# Patient Record
Sex: FEMALE | Race: WHITE | Employment: OTHER | URBAN - METROPOLITAN AREA
[De-identification: names, ages, dates, MRNs, and addresses within clinical notes are randomized per-mention and may not be internally consistent; named-entity substitution may affect disease eponyms.]

---

## 2020-08-17 ENCOUNTER — HOSPITAL ENCOUNTER (EMERGENCY)
Age: 60
Discharge: LEFT BEFORE DISCHARGE | End: 2020-08-17
Attending: EMERGENCY MEDICINE
Payer: COMMERCIAL

## 2020-08-17 ENCOUNTER — APPOINTMENT (OUTPATIENT)
Dept: GENERAL RADIOLOGY | Age: 60
End: 2020-08-17
Payer: COMMERCIAL

## 2020-08-17 ENCOUNTER — APPOINTMENT (OUTPATIENT)
Dept: CT IMAGING | Age: 60
End: 2020-08-17
Payer: COMMERCIAL

## 2020-08-17 VITALS
BODY MASS INDEX: 45.89 KG/M2 | TEMPERATURE: 97.4 F | HEART RATE: 83 BPM | HEIGHT: 63 IN | OXYGEN SATURATION: 98 % | RESPIRATION RATE: 18 BRPM | DIASTOLIC BLOOD PRESSURE: 61 MMHG | WEIGHT: 259 LBS | SYSTOLIC BLOOD PRESSURE: 126 MMHG

## 2020-08-17 LAB
ALBUMIN SERPL-MCNC: 4 G/DL (ref 3.5–4.6)
ALP BLD-CCNC: 83 U/L (ref 40–130)
ALT SERPL-CCNC: 26 U/L (ref 0–33)
ANION GAP SERPL CALCULATED.3IONS-SCNC: 14 MEQ/L (ref 9–15)
APTT: 20.7 SEC (ref 24.4–36.8)
AST SERPL-CCNC: 18 U/L (ref 0–35)
BASOPHILS ABSOLUTE: 0 K/UL (ref 0–0.2)
BASOPHILS RELATIVE PERCENT: 0.4 %
BILIRUB SERPL-MCNC: 0.3 MG/DL (ref 0.2–0.7)
BUN BLDV-MCNC: 35 MG/DL (ref 8–23)
CALCIUM SERPL-MCNC: 9.6 MG/DL (ref 8.5–9.9)
CHLORIDE BLD-SCNC: 104 MEQ/L (ref 95–107)
CO2: 22 MEQ/L (ref 20–31)
CREAT SERPL-MCNC: 1.31 MG/DL (ref 0.5–0.9)
D DIMER: 0.55 MG/L FEU (ref 0–0.5)
EKG ATRIAL RATE: 91 BPM
EKG P AXIS: 83 DEGREES
EKG P-R INTERVAL: 144 MS
EKG Q-T INTERVAL: 370 MS
EKG QRS DURATION: 90 MS
EKG QTC CALCULATION (BAZETT): 455 MS
EKG R AXIS: 25 DEGREES
EKG T AXIS: 89 DEGREES
EKG VENTRICULAR RATE: 91 BPM
EOSINOPHILS ABSOLUTE: 0 K/UL (ref 0–0.7)
EOSINOPHILS RELATIVE PERCENT: 0.4 %
GFR AFRICAN AMERICAN: 49.9
GFR NON-AFRICAN AMERICAN: 41.2
GLOBULIN: 2.7 G/DL (ref 2.3–3.5)
GLUCOSE BLD-MCNC: 181 MG/DL (ref 70–99)
HCT VFR BLD CALC: 42.1 % (ref 37–47)
HEMOGLOBIN: 13.7 G/DL (ref 12–16)
INR BLD: 0.9
LIPASE: 28 U/L (ref 12–95)
LYMPHOCYTES ABSOLUTE: 1.1 K/UL (ref 1–4.8)
LYMPHOCYTES RELATIVE PERCENT: 9.3 %
MCH RBC QN AUTO: 29.4 PG (ref 27–31.3)
MCHC RBC AUTO-ENTMCNC: 32.5 % (ref 33–37)
MCV RBC AUTO: 90.3 FL (ref 82–100)
MONOCYTES ABSOLUTE: 0.6 K/UL (ref 0.2–0.8)
MONOCYTES RELATIVE PERCENT: 5.3 %
NEUTROPHILS ABSOLUTE: 9.7 K/UL (ref 1.4–6.5)
NEUTROPHILS RELATIVE PERCENT: 84.6 %
PDW BLD-RTO: 14.8 % (ref 11.5–14.5)
PLATELET # BLD: 230 K/UL (ref 130–400)
POTASSIUM REFLEX MAGNESIUM: 4.1 MEQ/L (ref 3.4–4.9)
PROTHROMBIN TIME: 12.5 SEC (ref 12.3–14.9)
RBC # BLD: 4.66 M/UL (ref 4.2–5.4)
SODIUM BLD-SCNC: 140 MEQ/L (ref 135–144)
TOTAL PROTEIN: 6.7 G/DL (ref 6.3–8)
TROPONIN: 0.01 NG/ML (ref 0–0.01)
WBC # BLD: 11.5 K/UL (ref 4.8–10.8)

## 2020-08-17 PROCEDURE — 6360000002 HC RX W HCPCS: Performed by: EMERGENCY MEDICINE

## 2020-08-17 PROCEDURE — 71045 X-RAY EXAM CHEST 1 VIEW: CPT

## 2020-08-17 PROCEDURE — 85730 THROMBOPLASTIN TIME PARTIAL: CPT

## 2020-08-17 PROCEDURE — 83690 ASSAY OF LIPASE: CPT

## 2020-08-17 PROCEDURE — 99284 EMERGENCY DEPT VISIT MOD MDM: CPT

## 2020-08-17 PROCEDURE — 85379 FIBRIN DEGRADATION QUANT: CPT

## 2020-08-17 PROCEDURE — 85610 PROTHROMBIN TIME: CPT

## 2020-08-17 PROCEDURE — 96374 THER/PROPH/DIAG INJ IV PUSH: CPT

## 2020-08-17 PROCEDURE — 85025 COMPLETE CBC W/AUTO DIFF WBC: CPT

## 2020-08-17 PROCEDURE — 93005 ELECTROCARDIOGRAM TRACING: CPT | Performed by: EMERGENCY MEDICINE

## 2020-08-17 PROCEDURE — 84484 ASSAY OF TROPONIN QUANT: CPT

## 2020-08-17 PROCEDURE — 80053 COMPREHEN METABOLIC PANEL: CPT

## 2020-08-17 PROCEDURE — 6370000000 HC RX 637 (ALT 250 FOR IP): Performed by: EMERGENCY MEDICINE

## 2020-08-17 PROCEDURE — 2580000003 HC RX 258: Performed by: EMERGENCY MEDICINE

## 2020-08-17 PROCEDURE — 36415 COLL VENOUS BLD VENIPUNCTURE: CPT

## 2020-08-17 RX ORDER — 0.9 % SODIUM CHLORIDE 0.9 %
1000 INTRAVENOUS SOLUTION INTRAVENOUS ONCE
Status: COMPLETED | OUTPATIENT
Start: 2020-08-17 | End: 2020-08-17

## 2020-08-17 RX ORDER — ACETAMINOPHEN 325 MG/1
650 TABLET ORAL ONCE
Status: COMPLETED | OUTPATIENT
Start: 2020-08-17 | End: 2020-08-17

## 2020-08-17 RX ORDER — ONDANSETRON 2 MG/ML
INJECTION INTRAMUSCULAR; INTRAVENOUS
Status: DISPENSED
Start: 2020-08-17 | End: 2020-08-18

## 2020-08-17 RX ORDER — SODIUM CHLORIDE 9 MG/ML
INJECTION, SOLUTION INTRAVENOUS
Status: DISPENSED
Start: 2020-08-17 | End: 2020-08-18

## 2020-08-17 RX ORDER — ONDANSETRON 2 MG/ML
4 INJECTION INTRAMUSCULAR; INTRAVENOUS ONCE
Status: COMPLETED | OUTPATIENT
Start: 2020-08-17 | End: 2020-08-17

## 2020-08-17 RX ORDER — ACETAMINOPHEN 325 MG/1
TABLET ORAL
Status: DISCONTINUED
Start: 2020-08-17 | End: 2020-08-17 | Stop reason: HOSPADM

## 2020-08-17 RX ADMIN — ONDANSETRON 4 MG: 2 INJECTION INTRAMUSCULAR; INTRAVENOUS at 18:37

## 2020-08-17 RX ADMIN — ACETAMINOPHEN 650 MG: 325 TABLET ORAL at 20:39

## 2020-08-17 RX ADMIN — SODIUM CHLORIDE 1000 ML: 9 INJECTION, SOLUTION INTRAVENOUS at 18:37

## 2020-08-17 ASSESSMENT — PAIN - FUNCTIONAL ASSESSMENT: PAIN_FUNCTIONAL_ASSESSMENT: PREVENTS OR INTERFERES SOME ACTIVE ACTIVITIES AND ADLS

## 2020-08-17 ASSESSMENT — PAIN DESCRIPTION - ORIENTATION: ORIENTATION: UPPER

## 2020-08-17 ASSESSMENT — PAIN DESCRIPTION - LOCATION: LOCATION: ABDOMEN

## 2020-08-17 ASSESSMENT — PAIN SCALES - GENERAL
PAINLEVEL_OUTOF10: 10
PAINLEVEL_OUTOF10: 5

## 2020-08-17 ASSESSMENT — PAIN DESCRIPTION - ONSET: ONSET: SUDDEN

## 2020-08-17 ASSESSMENT — PAIN DESCRIPTION - FREQUENCY: FREQUENCY: INTERMITTENT

## 2020-08-17 ASSESSMENT — PAIN DESCRIPTION - DESCRIPTORS: DESCRIPTORS: ACHING

## 2020-08-17 ASSESSMENT — PAIN SCALES - WONG BAKER: WONGBAKER_NUMERICALRESPONSE: 2

## 2020-08-17 ASSESSMENT — PAIN DESCRIPTION - PROGRESSION: CLINICAL_PROGRESSION: NOT CHANGED

## 2020-08-17 ASSESSMENT — PAIN DESCRIPTION - PAIN TYPE: TYPE: ACUTE PAIN

## 2020-08-17 NOTE — ED PROVIDER NOTES
HPI:  8/17/20, Time: 5:53 PM EDT         Nolberto Mary is a 63 y.o. female presenting to the ED for dizzy, beginning 4 PM today. The complaint has been persistent, moderate in severity, and worsened by standing. Patient was in the restroom attempting to have a bowel movement when she passed out. she states she feels very nauseated right now and is having diffuse abdominal pain. No chest pain no shortness of breath. She was diaphoretic when this occurred she describes diffuse abdominal pain at present she states that she has renal insufficiency and cannot have a CAT scan with dye    ROS:   Pertinent positives and negatives are stated within HPI, all other systems reviewed and are negative.  --------------------------------------------- PAST HISTORY ---------------------------------------------  Past Medical History:  has no past medical history on file. Past Surgical History:  has no past surgical history on file. Social History:      Family History: family history is not on file. The patients home medications have been reviewed. Allergies: Gabapentin; Hyoscyamine; Irbesartan; Valsartan; Albuterol; Codeine; Oxycodone; Penicillins; Sulfa antibiotics; Alfentanil; Bupivacaine; Levsin  [hyoscyamine sulfate]; Nitrous oxide; Sulfacetamide sodium; Hydrochlorothiazide; and Phenazopyridine    ---------------------------------------------------PHYSICAL EXAM--------------------------------------     Constitutional/General: Alert and oriented x3, well appearing, non toxic in NAD  Head: Normocephalic and atraumatic  Eyes: PERRL, EOMI  Mouth: Oropharynx clear, handling secretions, no trismus  Neck: Supple, full ROM, non tender to palpation in the midline, no stridor, no crepitus, no meningeal signs  Pulmonary: Lungs clear to auscultation bilaterally, no wheezes, rales, or rhonchi. Not in respiratory distress  Cardiovascular:  Regular rate. Regular rhythm. No murmurs, gallops, or rubs.  2+ distal pulses  Chest: no chest wall tenderness  Abdomen: Soft. Non tender. Non distended. +BS. No rebound, guarding, or rigidity. No pulsatile masses appreciated. Musculoskeletal: Moves all extremities x 4. Warm and well perfused, no clubbing, cyanosis, or edema. Capillary refill <3 seconds  Skin: warm and dry. No rashes. Neurologic: GCS 15, CN 2-12 grossly intact, no focal deficits, symmetric strength 5/5 in the upper and lower extremities bilaterally  Psych: Normal Affect    -------------------------------------------------- RESULTS -------------------------------------------------  I have personally reviewed all laboratory and imaging results for this patient. Results are listed below. LABS:  No results found for this visit on 08/17/20. RADIOLOGY:  Interpreted by Radiologist.  XR CHEST PORTABLE    (Results Pending)         EKG Interpretation  Interpreted by emergency department physician    Rhythm: normal sinus   Rate: normal  Axis: normal  Conduction: normal  ST Segments: normal  T Waves: no acute change  Clinical Impression: no acute changes  Comparison to prior EKG: no previous EKG      ------------------------- NURSING NOTES AND VITALS REVIEWED ---------------------------   The nursing notes within the ED encounter and vital signs as below have been reviewed by myself. /70   Pulse 86   Resp 18   Ht 5' 3\" (1.6 m)   Wt 259 lb (117.5 kg)   SpO2 98%   BMI 45.88 kg/m²   Oxygen Saturation Interpretation: Normal    The patients available past medical records and past encounters were reviewed. ------------------------------ ED COURSE/MEDICAL DECISION MAKING----------------------  Medications   0.9 % sodium chloride bolus (1,000 mLs Intravenous New Bag 8/17/20 1837)   ondansetron (ZOFRAN) injection 4 mg (4 mg Intravenous Given 8/17/20 1837)         I have ordered a liter of normal saline. In addition we will obtain lab work EKG shows normal sinus rhythm.   I have ordered a CAT scan of the abdomen and pelvis without contrast    Care was endorsed to the night physician at 7 PM August 17, 2020    Medical Decision Making:        Re-Evaluations:             Re-evaluation. Patients symptoms are improving      Consultations: This patient's ED course included: re-evaluation prior to disposition and a personal history and physicial eaxmination    This patient has remained hemodynamically stable, improved and been closely monitored during their ED course. On my evaluation patient states she is feeling well. Informed of elevated trop, dimer. Patient refusing CT abd/pelvis and chest stating \"I have CT scheduled later this week. \"  Informed of risk of missed PE, abd process requiring immediate surgical intervention. Informed of risk of elevated trop. Refusing to stay for admission or repeat lab draw in 2-3 hrs. Patient requesting to leave AMA. Patient is alert and oriented, appropriate and exhibited no signs/ symptoms of psychosis or suicidal ideation. Patient made aware of my recommendations for further testing and treatment, and/or hospitalization or transfer to another setting. Risks of refusal of recommended care were disclosed to the patient including, but not limited to death, permanent mental/ physical impairment, loss of current lifestyle, paralysis or coma. Patient encouraged to return to the ED at any time. Counseling: The emergency provider has spoken with the patient and discussed todays results, in addition to providing specific details for the plan of care and counseling regarding the diagnosis and prognosis. Questions are answered at this time and they are agreeable with the plan.       --------------------------------- IMPRESSION AND DISPOSITION ---------------------------------    IMPRESSION  1. Dizziness        DISPOSITION  Disposition: Discharge to home  Patient condition is good        NOTE: This report was transcribed using voice recognition software.  Every effort was made to ensure accuracy; however, inadvertent computerized transcription errors may be present          Zack Garcia MD  08/17/20 1633

## 2020-08-17 NOTE — ED TRIAGE NOTES
Pt reports that she was having a BM and got really dizzy and hot pt alert rr even non labored skin wnl

## 2020-08-18 PROCEDURE — 93010 ELECTROCARDIOGRAM REPORT: CPT | Performed by: INTERNAL MEDICINE

## 2024-03-01 DIAGNOSIS — I10 BENIGN ESSENTIAL HYPERTENSION: ICD-10-CM

## 2024-03-04 PROBLEM — I10 BENIGN ESSENTIAL HYPERTENSION: Status: ACTIVE | Noted: 2024-03-04

## 2024-03-04 PROBLEM — M79.606 LEG PAIN: Status: ACTIVE | Noted: 2024-03-04

## 2024-03-04 PROBLEM — G47.30 SLEEP APNEA: Status: ACTIVE | Noted: 2024-03-04

## 2024-03-04 PROBLEM — I48.91 ATRIAL FIBRILLATION (MULTI): Status: ACTIVE | Noted: 2024-03-04

## 2024-03-04 PROBLEM — E78.5 HYPERLIPIDEMIA: Status: ACTIVE | Noted: 2024-03-04

## 2024-03-04 PROBLEM — R25.2 LEG CRAMPS: Status: ACTIVE | Noted: 2024-03-04

## 2024-03-04 PROBLEM — Z95.1 HISTORY OF CORONARY ARTERY BYPASS GRAFT: Status: ACTIVE | Noted: 2024-03-04

## 2024-03-04 PROBLEM — I25.10 MULTIPLE VESSEL CORONARY ARTERY DISEASE: Status: ACTIVE | Noted: 2024-03-04

## 2024-03-04 PROBLEM — E11.9 DIABETES MELLITUS (MULTI): Status: ACTIVE | Noted: 2024-03-04

## 2024-03-04 RX ORDER — NYSTATIN 100000 [USP'U]/G
1 POWDER TOPICAL DAILY
COMMUNITY
End: 2024-05-10 | Stop reason: ALTCHOICE

## 2024-03-04 RX ORDER — LISINOPRIL 20 MG/1
0.5 TABLET ORAL DAILY
COMMUNITY

## 2024-03-04 RX ORDER — CHLORTHALIDONE 25 MG/1
TABLET ORAL
Qty: 90 TABLET | Refills: 3 | Status: SHIPPED | OUTPATIENT
Start: 2024-03-04

## 2024-03-04 RX ORDER — ASPIRIN 81 MG/1
1 TABLET ORAL DAILY
COMMUNITY

## 2024-03-04 RX ORDER — ATORVASTATIN CALCIUM 80 MG/1
80 TABLET, FILM COATED ORAL DAILY
COMMUNITY

## 2024-03-04 RX ORDER — MONTELUKAST SODIUM 10 MG/1
10 TABLET ORAL DAILY
COMMUNITY

## 2024-03-04 RX ORDER — AMLODIPINE BESYLATE 5 MG/1
5 TABLET ORAL DAILY
COMMUNITY
End: 2024-05-10 | Stop reason: ALTCHOICE

## 2024-03-04 RX ORDER — INSULIN GLARGINE 300 U/ML
INJECTION, SOLUTION SUBCUTANEOUS
COMMUNITY

## 2024-03-04 RX ORDER — PARSLEY 450 MG
1 CAPSULE ORAL DAILY
COMMUNITY
End: 2024-05-10 | Stop reason: ALTCHOICE

## 2024-03-04 RX ORDER — INSULIN LISPRO 100 [IU]/ML
INJECTION, SOLUTION INTRAVENOUS; SUBCUTANEOUS
COMMUNITY

## 2024-03-04 RX ORDER — NITROGLYCERIN 0.4 MG/1
0.4 TABLET SUBLINGUAL EVERY 5 MIN PRN
COMMUNITY
Start: 2022-06-28

## 2024-03-04 RX ORDER — CHLORTHALIDONE 25 MG/1
25 TABLET ORAL DAILY
COMMUNITY
End: 2024-05-10 | Stop reason: ALTCHOICE

## 2024-03-04 RX ORDER — FERROUS SULFATE 325(65) MG
1 TABLET ORAL
COMMUNITY

## 2024-03-04 RX ORDER — OMEPRAZOLE 40 MG/1
40 CAPSULE, DELAYED RELEASE ORAL
COMMUNITY

## 2024-03-04 RX ORDER — METOPROLOL TARTRATE 50 MG/1
1 TABLET ORAL
COMMUNITY

## 2024-05-10 ENCOUNTER — OFFICE VISIT (OUTPATIENT)
Dept: CARDIOLOGY | Facility: CLINIC | Age: 64
End: 2024-05-10
Payer: MEDICARE

## 2024-05-10 VITALS — HEART RATE: 78 BPM | SYSTOLIC BLOOD PRESSURE: 100 MMHG | DIASTOLIC BLOOD PRESSURE: 60 MMHG

## 2024-05-10 DIAGNOSIS — G47.33 OBSTRUCTIVE SLEEP APNEA SYNDROME: ICD-10-CM

## 2024-05-10 DIAGNOSIS — I25.10 MULTIPLE VESSEL CORONARY ARTERY DISEASE: Primary | ICD-10-CM

## 2024-05-10 DIAGNOSIS — Z79.899 HIGH RISK MEDICATION USE: ICD-10-CM

## 2024-05-10 DIAGNOSIS — I10 BENIGN ESSENTIAL HYPERTENSION: ICD-10-CM

## 2024-05-10 DIAGNOSIS — E11.59 TYPE 2 DIABETES MELLITUS WITH OTHER CIRCULATORY COMPLICATION, WITH LONG-TERM CURRENT USE OF INSULIN (MULTI): ICD-10-CM

## 2024-05-10 DIAGNOSIS — Z95.1 HISTORY OF CORONARY ARTERY BYPASS GRAFT: ICD-10-CM

## 2024-05-10 DIAGNOSIS — I48.19 PERSISTENT ATRIAL FIBRILLATION (MULTI): ICD-10-CM

## 2024-05-10 DIAGNOSIS — Z79.4 TYPE 2 DIABETES MELLITUS WITH OTHER CIRCULATORY COMPLICATION, WITH LONG-TERM CURRENT USE OF INSULIN (MULTI): ICD-10-CM

## 2024-05-10 DIAGNOSIS — Z78.9 NEVER SMOKED TOBACCO: ICD-10-CM

## 2024-05-10 DIAGNOSIS — E78.2 MIXED HYPERLIPIDEMIA: ICD-10-CM

## 2024-05-10 PROCEDURE — 3078F DIAST BP <80 MM HG: CPT | Performed by: INTERNAL MEDICINE

## 2024-05-10 PROCEDURE — 1036F TOBACCO NON-USER: CPT | Performed by: INTERNAL MEDICINE

## 2024-05-10 PROCEDURE — 3074F SYST BP LT 130 MM HG: CPT | Performed by: INTERNAL MEDICINE

## 2024-05-10 PROCEDURE — 4010F ACE/ARB THERAPY RXD/TAKEN: CPT | Performed by: INTERNAL MEDICINE

## 2024-05-10 PROCEDURE — 99214 OFFICE O/P EST MOD 30 MIN: CPT | Performed by: INTERNAL MEDICINE

## 2024-05-10 PROCEDURE — 93000 ELECTROCARDIOGRAM COMPLETE: CPT | Performed by: INTERNAL MEDICINE

## 2024-05-10 RX ORDER — SEMAGLUTIDE 1.34 MG/ML
1 INJECTION, SOLUTION SUBCUTANEOUS
COMMUNITY

## 2024-05-10 RX ORDER — CALCIUM CARBONATE 300MG(750)
400 TABLET,CHEWABLE ORAL DAILY
COMMUNITY

## 2024-05-10 RX ORDER — ACETAMINOPHEN 500 MG
2000 TABLET ORAL DAILY
COMMUNITY

## 2024-05-10 RX ORDER — OXYBUTYNIN CHLORIDE 5 MG/1
5 TABLET ORAL 2 TIMES DAILY
COMMUNITY

## 2024-05-10 RX ORDER — AMOXICILLIN 250 MG
1 CAPSULE ORAL DAILY PRN
COMMUNITY

## 2024-05-10 NOTE — PROGRESS NOTES
Subjective   Janell Kwon is a 63 y.o. female       Chief Complaint    Hospital Follow-up; Follow-up          HPI        Patient is here for follow-up continue management for history of coronary artery disease with prior bypass surgery in 2022, postoperative atrial fibrillation, morbid obesity, hypertension and hyperlipidemia.  Since last time I saw her she reports she is stable cardiac wise.  She has not had any complaint of chest pain, palpitation, lightheadedness, dizziness or syncope.  She reports she has lost close to 50 pounds on Ozempic.     Assessment     1. Multivessel coronary artery disease with bypass surgery in 2022 [facility]. That including LIMA to the LAD, saphenous vein graft to the marginal, saphenous vein graft to the PDA and reported saphenous vein graft to the left main  2.  Postoperative atrial fibrillation. Amiodarone was discontinued. Remain in sinus rhythm  3. Morbid obesity with recent 50 pound weight loss on Ozempic she could not stand to be awaited  4. Hypertension well controlled   5. Diabetes mellitus  6. Hyperlipidemia she report lab done recently we will try to retrieve  7. Bilateral leg pain and recurrent cellulitis. Her recent SANDHYA was reassuring  8. Sleep apnea noncompliant with CPAP       Plan     1. I recommended the patient to present medical therapy  2. I we will try to retrieve her recent lab work  3.  I reviewed her at [facility] record  4. I advised her to see her sleep specialist and to consider either resuming CPAP or consider the new device Spire for management of sleep apnea  5. The importance of losing weight, exercise and dietary modification discussed with patient   6. We will see her back in 9 months     Review of Systems   All other systems reviewed and are negative.           Vitals:    05/10/24 1102   BP: 100/60   BP Location: Right arm   Patient Position: Sitting   Pulse: 78      EKG done in office today     Objective   Physical Exam  Constitutional:        Appearance: Normal appearance.   HENT:      Nose: Nose normal.   Neck:      Vascular: No carotid bruit.   Cardiovascular:      Rate and Rhythm: Normal rate.      Pulses: Normal pulses.      Heart sounds: Normal heart sounds.   Pulmonary:      Effort: Pulmonary effort is normal.   Abdominal:      General: Bowel sounds are normal.      Palpations: Abdomen is soft.   Musculoskeletal:         General: Normal range of motion.      Cervical back: Normal range of motion.      Right lower leg: No edema.      Left lower leg: No edema.   Skin:     General: Skin is warm and dry.   Neurological:      General: No focal deficit present.      Mental Status: She is alert.   Psychiatric:         Mood and Affect: Mood normal.         Behavior: Behavior normal.         Thought Content: Thought content normal.         Judgment: Judgment normal.         Allergies  Gabapentin, Irbesartan, Valsartan, Albuterol, Codeine, Hydrochlorothiazide, Hyoscyamine, Nitrous oxide, Phenazopyridine, and Sulfacetamide     Current Medications    Current Outpatient Medications:     aspirin 81 mg EC tablet, Take 1 tablet (81 mg) by mouth once daily., Disp: , Rfl:     atorvastatin (Lipitor) 80 mg tablet, Take 1 tablet (80 mg) by mouth once daily., Disp: , Rfl:     cetirizine (ZYRTEC) 10 mg capsule, Take 1 capsule (10 mg) by mouth early in the morning.., Disp: , Rfl:     chlorthalidone (Hygroton) 25 mg tablet, TAKE 1 TABLET BY MOUTH ONCE DAILY - STOP LASIX, Disp: 90 tablet, Rfl: 3    cholecalciferol (Vitamin D3) 50 mcg (2,000 unit) capsule, Take 1 capsule (50 mcg) by mouth early in the morning.., Disp: , Rfl:     ferrous sulfate, 325 mg ferrous sulfate, tablet, Take 1 tablet by mouth 2 times a day with meals., Disp: , Rfl:     HumaLOG KwikPen Insulin 100 unit/mL injection, Inject under the skin 3 times a day with meals., Disp: , Rfl:     lisinopril 20 mg tablet, Take 0.5 tablets (10 mg) by mouth once daily., Disp: , Rfl:     magnesium oxide (Mag-Ox) 400  mg tablet, Take 1 tablet (400 mg) by mouth once daily., Disp: , Rfl:     metoprolol tartrate (Lopressor) 50 mg tablet, Take 1 tablet by mouth 2 times a day with meals., Disp: , Rfl:     montelukast (Singulair) 10 mg tablet, Take 1 tablet (10 mg) by mouth once daily., Disp: , Rfl:     nitroglycerin (Nitrostat) 0.4 mg SL tablet, Place 1 tablet (0.4 mg) under the tongue every 5 minutes if needed for chest pain., Disp: , Rfl:     omeprazole (PriLOSEC) 40 mg DR capsule, Take 1 capsule (40 mg) by mouth once daily in the morning. Take before meals., Disp: , Rfl:     oxybutynin (Ditropan) 5 mg tablet, Take 1 tablet (5 mg) by mouth 2 times a day. Bladder spasms, Disp: , Rfl:     semaglutide (Ozempic) 1 mg/dose (4 mg/3 mL) pen injector, Inject 1 mg under the skin every 7 days., Disp: , Rfl:     sennosides-docusate sodium (Koki-Colace) 8.6-50 mg tablet, Take 1 tablet by mouth once daily as needed for constipation., Disp: , Rfl:     Toujeo Max U-300 SoloStar 300 unit/mL (3 mL) injection, Inject under the skin., Disp: , Rfl:                      Assessment/Plan   1. Multiple vessel coronary artery disease  Follow Up In Cardiology      2. Persistent atrial fibrillation (Multi)  ECG 12 Lead      3. Benign essential hypertension        4. History of coronary artery bypass graft        5. Mixed hyperlipidemia        6. High risk medication use        7. Never smoked tobacco        8. Type 2 diabetes mellitus with other circulatory complication, with long-term current use of insulin (Multi)        9. Obstructive sleep apnea syndrome                 Scribe Attestation  By signing my name below, Gerri LY LPN, Scribe   attest that this documentation has been prepared under the direction and in the presence of Zbigniew Pham MD.     Provider Attestation - Scribe documentation    All medical record entries made by the Scribe were at my direction and personally dictated by me. I have reviewed the chart and agree that the record  accurately reflects my personal performance of the history, physical exam, discussion and plan.

## 2024-10-22 DIAGNOSIS — I10 BENIGN ESSENTIAL HYPERTENSION: ICD-10-CM

## 2024-10-23 RX ORDER — CHLORTHALIDONE 25 MG/1
25 TABLET ORAL DAILY
Qty: 90 TABLET | Refills: 3 | Status: SHIPPED | OUTPATIENT
Start: 2024-10-23 | End: 2025-10-23

## 2025-02-14 ENCOUNTER — APPOINTMENT (OUTPATIENT)
Dept: CARDIOLOGY | Facility: CLINIC | Age: 65
End: 2025-02-14
Payer: COMMERCIAL

## 2025-03-10 ENCOUNTER — APPOINTMENT (OUTPATIENT)
Dept: CARDIOLOGY | Facility: CLINIC | Age: 65
End: 2025-03-10
Payer: COMMERCIAL

## 2025-04-24 ENCOUNTER — APPOINTMENT (OUTPATIENT)
Dept: CARDIOLOGY | Facility: CLINIC | Age: 65
End: 2025-04-24
Payer: COMMERCIAL

## 2025-04-24 VITALS
HEART RATE: 72 BPM | HEIGHT: 62 IN | SYSTOLIC BLOOD PRESSURE: 120 MMHG | DIASTOLIC BLOOD PRESSURE: 80 MMHG | BODY MASS INDEX: 48.76 KG/M2 | WEIGHT: 265 LBS

## 2025-04-24 DIAGNOSIS — R07.89 OTHER CHEST PAIN: Primary | ICD-10-CM

## 2025-04-24 DIAGNOSIS — G47.33 OBSTRUCTIVE SLEEP APNEA SYNDROME: ICD-10-CM

## 2025-04-24 DIAGNOSIS — E78.2 MIXED HYPERLIPIDEMIA: ICD-10-CM

## 2025-04-24 DIAGNOSIS — I25.10 MULTIPLE VESSEL CORONARY ARTERY DISEASE: ICD-10-CM

## 2025-04-24 DIAGNOSIS — Z78.9 NEVER SMOKED TOBACCO: ICD-10-CM

## 2025-04-24 DIAGNOSIS — I10 BENIGN ESSENTIAL HYPERTENSION: ICD-10-CM

## 2025-04-24 DIAGNOSIS — Z95.1 HISTORY OF CORONARY ARTERY BYPASS GRAFT: ICD-10-CM

## 2025-04-24 DIAGNOSIS — I48.19 PERSISTENT ATRIAL FIBRILLATION (MULTI): ICD-10-CM

## 2025-04-24 PROCEDURE — 1160F RVW MEDS BY RX/DR IN RCRD: CPT | Performed by: INTERNAL MEDICINE

## 2025-04-24 PROCEDURE — 1036F TOBACCO NON-USER: CPT | Performed by: INTERNAL MEDICINE

## 2025-04-24 PROCEDURE — 3079F DIAST BP 80-89 MM HG: CPT | Performed by: INTERNAL MEDICINE

## 2025-04-24 PROCEDURE — 1159F MED LIST DOCD IN RCRD: CPT | Performed by: INTERNAL MEDICINE

## 2025-04-24 PROCEDURE — 4010F ACE/ARB THERAPY RXD/TAKEN: CPT | Performed by: INTERNAL MEDICINE

## 2025-04-24 PROCEDURE — G2211 COMPLEX E/M VISIT ADD ON: HCPCS | Performed by: INTERNAL MEDICINE

## 2025-04-24 PROCEDURE — 3008F BODY MASS INDEX DOCD: CPT | Performed by: INTERNAL MEDICINE

## 2025-04-24 PROCEDURE — 3074F SYST BP LT 130 MM HG: CPT | Performed by: INTERNAL MEDICINE

## 2025-04-24 PROCEDURE — 99214 OFFICE O/P EST MOD 30 MIN: CPT | Performed by: INTERNAL MEDICINE

## 2025-04-24 RX ORDER — SERTRALINE HYDROCHLORIDE 50 MG/1
50 TABLET, FILM COATED ORAL DAILY
COMMUNITY

## 2025-04-24 RX ORDER — TROSPIUM CHLORIDE 20 MG/1
20 TABLET, FILM COATED ORAL 2 TIMES DAILY
COMMUNITY

## 2025-04-24 ASSESSMENT — ENCOUNTER SYMPTOMS: SHORTNESS OF BREATH: 1

## 2025-04-24 NOTE — PROGRESS NOTES
Chief Complaint   Patient presents with    Follow-up     9 months multiple vessel coronary artery disease       Subjective   Janell Kwon is a 64 y.o. female     HPI   Patient is here for follow-up and management for history of coronary artery disease prior bypass surgery, postoperative atrial fibrillation, obesity and hyperlipidemia.  Since last time I saw her she she was in the hospital recently for atypical chest pain.  Her cardiac workup and outpatient stress test was negative.  She describes atypical chest pain mainly when she go to bed.  This seems to suggest likely a GI etiology or reflux.    Assessment     1. Multivessel coronary artery disease with bypass surgery in 2022 [facility]. That including LIMA to the LAD, saphenous vein graft to the marginal, saphenous vein graft to the PDA and reported saphenous vein graft to the left main.  Recent stress test was negative  2.  Postoperative atrial fibrillation. Amiodarone was discontinued. Remain in sinus rhythm no recurrence  3. Morbid obesity   4. Hypertension well controlled   5. Diabetes mellitus  6. Hyperlipidemia controlled  7. Bilateral leg pain and recurrent cellulitis. Her recent SANDHYA was reassuring  8. Sleep apnea noncompliant with CPAP  9.  Recent complaint of recumbent chest pain likely GI        Plan     1. I recommended the patient to present medical therapy and I advised her to try some Maalox or Mylanta for her chest pain  2. I reviewed her recent hospitalization record and a stress test  3. . I advised her to see her sleep specialist and to consider either resuming CPAP or consider the new device Spire for management of sleep apnea  4. The importance of losing weight, exercise and dietary modification discussed with patient   5. We will see her back in 6 months   Review of Systems   Cardiovascular:  Positive for chest pain.   Respiratory:  Positive for shortness of breath.    All other systems reviewed and are negative.           Vitals:     "04/24/25 1421   BP: 120/80   BP Location: Right arm   Patient Position: Sitting   Pulse: 72   Weight: 120 kg (265 lb)   Height: 1.575 m (5' 2\")        Objective   Physical Exam  Constitutional:       Appearance: Normal appearance.   HENT:      Nose: Nose normal.   Neck:      Vascular: No carotid bruit.   Cardiovascular:      Rate and Rhythm: Normal rate.      Pulses: Normal pulses.      Heart sounds: Normal heart sounds.   Pulmonary:      Effort: Pulmonary effort is normal.   Abdominal:      General: Bowel sounds are normal.      Palpations: Abdomen is soft.   Musculoskeletal:         General: Normal range of motion.      Cervical back: Normal range of motion.      Right lower leg: No edema.      Left lower leg: No edema.   Skin:     General: Skin is warm and dry.   Neurological:      General: No focal deficit present.      Mental Status: She is alert.   Psychiatric:         Mood and Affect: Mood normal.         Behavior: Behavior normal.         Thought Content: Thought content normal.         Judgment: Judgment normal.         Allergies  Gabapentin, Irbesartan, Valsartan, Albuterol, Codeine, Hydrochlorothiazide, Hyoscyamine, Nitrous oxide, Phenazopyridine, and Sulfacetamide     Current Medications  Current Outpatient Medications   Medication Instructions    aspirin 81 mg EC tablet 1 tablet, Daily    atorvastatin (LIPITOR) 80 mg, Daily    chlorthalidone (HYGROTON) 25 mg, oral, Daily    cholecalciferol (VITAMIN D3) 2,000 Units, Daily    ferrous sulfate, 325 mg ferrous sulfate, tablet 1 tablet, 2 times daily (morning and late afternoon)    HumaLOG KwikPen Insulin 100 unit/mL injection 3 times daily (morning, midday, late afternoon)    lisinopril 20 mg tablet 0.5 tablets, Daily    magnesium oxide (MAG-OX) 400 mg, Daily    metoprolol tartrate (Lopressor) 50 mg tablet 1 tablet, 2 times daily (morning and late afternoon)    montelukast (SINGULAIR) 10 mg, Daily    nitroglycerin (NITROSTAT) 0.4 mg, Every 5 min PRN    " omeprazole (PRILOSEC) 40 mg, Daily before breakfast    sennosides-docusate sodium (Koki-Colace) 8.6-50 mg tablet 1 tablet, Daily PRN    sertraline (ZOLOFT) 50 mg, Daily    Toujeo Max U-300 SoloStar 300 unit/mL (3 mL) injection Inject under the skin.    trospium (SANCTURA) 20 mg, 2 times daily                        Assessment/Plan   1. Other chest pain        2. Multiple vessel coronary artery disease  Follow Up In Cardiology    Follow Up In Cardiology      3. Mixed hyperlipidemia        4. History of coronary artery bypass graft        5. Benign essential hypertension        6. Persistent atrial fibrillation (Multi)        7. Obstructive sleep apnea syndrome        8. Never smoked tobacco        9. BMI 45.0-49.9, adult (Multi)                 Scribe Attestation  By signing my name below, Gerri LY LPN, Scribe   attest that this documentation has been prepared under the direction and in the presence of Zbigniew Pham MD.     Provider Attestation - Scribe documentation    All medical record entries made by the Scribe were at my direction and personally dictated by me. I have reviewed the chart and agree that the record accurately reflects my personal performance of the history, physical exam, discussion and plan.

## 2025-04-24 NOTE — PATIENT INSTRUCTIONS
Please bring all medicines, vitamins, and herbal supplements with you when you come to the office.    Prescriptions will not be filled unless you are compliant with your follow up appointments or have a follow up appointment scheduled as per instruction of your physician. Refills should be requested at the time of your visit.     BMI was above normal measurement. Current weight: 120 kg (265 lb)  Weight change since last visit (-) denotes wt loss 5 lbs   Weight loss needed to achieve BMI 25: 128.6 Lbs  Weight loss needed to achieve BMI 30: 101.3 Lbs  Provided instructions on dietary changes.    Can use Maalox or Mylanta for chest discomfort  Follow up

## 2025-04-24 NOTE — LETTER
April 24, 2025     Asiya Mariscal DO  [address]  [address] [facility] [address]    Patient: Janell Kwon   YOB: 1960   Date of Visit: 4/24/2025       Dear Dr. Asiya Mariscal DO:    Thank you for referring Janell Kwon to me for evaluation. Below are my notes for this consultation.  If you have questions, please do not hesitate to call me. I look forward to following your patient along with you.       Sincerely,     Zbigniew Pham MD      CC: No Recipients  ______________________________________________________________________________________    Chief Complaint   Patient presents with   • Follow-up     9 months multiple vessel coronary artery disease       Subjective   Janell Kwon is a 64 y.o. female     HPI   Patient is here for follow-up and management for history of coronary artery disease prior bypass surgery, postoperative atrial fibrillation, obesity and hyperlipidemia.  Since last time I saw her she she was in the hospital recently for atypical chest pain.  Her cardiac workup and outpatient stress test was negative.  She describes atypical chest pain mainly when she go to bed.  This seems to suggest likely a GI etiology or reflux.    Assessment     1. Multivessel coronary artery disease with bypass surgery in 2022 [facility]. That including LIMA to the LAD, saphenous vein graft to the marginal, saphenous vein graft to the PDA and reported saphenous vein graft to the left main.  Recent stress test was negative  2.  Postoperative atrial fibrillation. Amiodarone was discontinued. Remain in sinus rhythm no recurrence  3. Morbid obesity   4. Hypertension well controlled   5. Diabetes mellitus  6. Hyperlipidemia controlled  7. Bilateral leg pain and recurrent cellulitis. Her recent SANDHYA was reassuring  8. Sleep apnea noncompliant with CPAP  9.  Recent complaint of recumbent chest pain likely GI        Plan     1. I recommended the patient to present medical therapy and I  "advised her to try some Maalox or Mylanta for her chest pain  2. I reviewed her recent hospitalization record and a stress test  3. . I advised her to see her sleep specialist and to consider either resuming CPAP or consider the new device Spire for management of sleep apnea  4. The importance of losing weight, exercise and dietary modification discussed with patient   5. We will see her back in 6 months   Review of Systems   Cardiovascular:  Positive for chest pain.   Respiratory:  Positive for shortness of breath.    All other systems reviewed and are negative.           Vitals:    04/24/25 1421   BP: 120/80   BP Location: Right arm   Patient Position: Sitting   Pulse: 72   Weight: 120 kg (265 lb)   Height: 1.575 m (5' 2\")        Objective   Physical Exam  Constitutional:       Appearance: Normal appearance.   HENT:      Nose: Nose normal.   Neck:      Vascular: No carotid bruit.   Cardiovascular:      Rate and Rhythm: Normal rate.      Pulses: Normal pulses.      Heart sounds: Normal heart sounds.   Pulmonary:      Effort: Pulmonary effort is normal.   Abdominal:      General: Bowel sounds are normal.      Palpations: Abdomen is soft.   Musculoskeletal:         General: Normal range of motion.      Cervical back: Normal range of motion.      Right lower leg: No edema.      Left lower leg: No edema.   Skin:     General: Skin is warm and dry.   Neurological:      General: No focal deficit present.      Mental Status: She is alert.   Psychiatric:         Mood and Affect: Mood normal.         Behavior: Behavior normal.         Thought Content: Thought content normal.         Judgment: Judgment normal.         Allergies  Gabapentin, Irbesartan, Valsartan, Albuterol, Codeine, Hydrochlorothiazide, Hyoscyamine, Nitrous oxide, Phenazopyridine, and Sulfacetamide     Current Medications  Current Outpatient Medications   Medication Instructions   • aspirin 81 mg EC tablet 1 tablet, Daily   • atorvastatin (LIPITOR) 80 mg, " Daily   • chlorthalidone (HYGROTON) 25 mg, oral, Daily   • cholecalciferol (VITAMIN D3) 2,000 Units, Daily   • ferrous sulfate, 325 mg ferrous sulfate, tablet 1 tablet, 2 times daily (morning and late afternoon)   • HumaLOG KwikPen Insulin 100 unit/mL injection 3 times daily (morning, midday, late afternoon)   • lisinopril 20 mg tablet 0.5 tablets, Daily   • magnesium oxide (MAG-OX) 400 mg, Daily   • metoprolol tartrate (Lopressor) 50 mg tablet 1 tablet, 2 times daily (morning and late afternoon)   • montelukast (SINGULAIR) 10 mg, Daily   • nitroglycerin (NITROSTAT) 0.4 mg, Every 5 min PRN   • omeprazole (PRILOSEC) 40 mg, Daily before breakfast   • sennosides-docusate sodium (Koki-Colace) 8.6-50 mg tablet 1 tablet, Daily PRN   • sertraline (ZOLOFT) 50 mg, Daily   • Toujeo Max U-300 SoloStar 300 unit/mL (3 mL) injection Inject under the skin.   • trospium (SANCTURA) 20 mg, 2 times daily                        Assessment/Plan   1. Other chest pain        2. Multiple vessel coronary artery disease  Follow Up In Cardiology    Follow Up In Cardiology      3. Mixed hyperlipidemia        4. History of coronary artery bypass graft        5. Benign essential hypertension        6. Persistent atrial fibrillation (Multi)        7. Obstructive sleep apnea syndrome        8. Never smoked tobacco        9. BMI 45.0-49.9, adult (Multi)                 Scribe Attestation  By signing my name below, Gerri LY LPN, Scribe   attest that this documentation has been prepared under the direction and in the presence of Zbigniew Pham MD.     Provider Attestation - Scribe documentation    All medical record entries made by the Scribe were at my direction and personally dictated by me. I have reviewed the chart and agree that the record accurately reflects my personal performance of the history, physical exam, discussion and plan.

## 2025-07-29 ENCOUNTER — PATIENT OUTREACH (OUTPATIENT)
Dept: CARDIOLOGY | Facility: CLINIC | Age: 65
End: 2025-07-29
Payer: COMMERCIAL

## 2025-07-29 NOTE — PROGRESS NOTES
Discharge Facility:  [facility]  Discharge Diagnosis:  SOB  Admission Date:  7/19/25  Discharge Date:  7/25/25    PCP Appointment Date:  TBD  Specialist Appointment Date:  Message sent to Dr Pham's office  Hospital Encounter and Summary Linked: Yes       Hospital Encounter  See discharge assessment below for further details     2 call attempts completed with no return calls as of this note

## 2025-08-08 ENCOUNTER — PATIENT OUTREACH (OUTPATIENT)
Dept: CARDIOLOGY | Facility: CLINIC | Age: 65
End: 2025-08-08
Payer: COMMERCIAL

## 2025-08-08 NOTE — PROGRESS NOTES
Discharge Facility:  [facility]  Discharge Diagnosis:  CP, A-fib  Admission Date:  8/5/25  Discharge Date:  8/7/25    PCP Appointment Date:  TBD  Specialist Appointment Date:   AUG 13  2025 03:30 PM - Cardiology Hospital Discharge  [facility] - Zbigniew Pham MD     Hospital Encounter and Summary Linked: Yes  Scanned Document with Generic Provider Scanning (08/05/2025)       Two attempts were made to reach patient within two business days after discharge. Left voicemail with contact information for patient to call back with any non-emergent questions or concerns.

## 2025-08-13 ENCOUNTER — APPOINTMENT (OUTPATIENT)
Dept: CARDIOLOGY | Facility: CLINIC | Age: 65
End: 2025-08-13
Payer: COMMERCIAL

## 2025-08-13 VITALS
SYSTOLIC BLOOD PRESSURE: 130 MMHG | HEIGHT: 62 IN | HEART RATE: 72 BPM | WEIGHT: 268 LBS | DIASTOLIC BLOOD PRESSURE: 72 MMHG | BODY MASS INDEX: 49.32 KG/M2

## 2025-08-13 DIAGNOSIS — E78.2 MIXED HYPERLIPIDEMIA: ICD-10-CM

## 2025-08-13 DIAGNOSIS — Z78.9 NEVER SMOKED TOBACCO: ICD-10-CM

## 2025-08-13 DIAGNOSIS — I10 BENIGN ESSENTIAL HYPERTENSION: ICD-10-CM

## 2025-08-13 DIAGNOSIS — R07.89 OTHER CHEST PAIN: ICD-10-CM

## 2025-08-13 DIAGNOSIS — I25.10 MULTIPLE VESSEL CORONARY ARTERY DISEASE: ICD-10-CM

## 2025-08-13 DIAGNOSIS — I48.0 PAROXYSMAL ATRIAL FIBRILLATION (MULTI): Primary | ICD-10-CM

## 2025-08-13 DIAGNOSIS — Z79.899 HIGH RISK MEDICATION USE: ICD-10-CM

## 2025-08-13 DIAGNOSIS — Z95.1 HISTORY OF CORONARY ARTERY BYPASS GRAFT: ICD-10-CM

## 2025-08-13 PROCEDURE — 1036F TOBACCO NON-USER: CPT | Performed by: INTERNAL MEDICINE

## 2025-08-13 PROCEDURE — 1159F MED LIST DOCD IN RCRD: CPT | Performed by: INTERNAL MEDICINE

## 2025-08-13 PROCEDURE — 3075F SYST BP GE 130 - 139MM HG: CPT | Performed by: INTERNAL MEDICINE

## 2025-08-13 PROCEDURE — 3008F BODY MASS INDEX DOCD: CPT | Performed by: INTERNAL MEDICINE

## 2025-08-13 PROCEDURE — 1160F RVW MEDS BY RX/DR IN RCRD: CPT | Performed by: INTERNAL MEDICINE

## 2025-08-13 PROCEDURE — 99496 TRANSJ CARE MGMT HIGH F2F 7D: CPT | Performed by: INTERNAL MEDICINE

## 2025-08-13 PROCEDURE — 3078F DIAST BP <80 MM HG: CPT | Performed by: INTERNAL MEDICINE

## 2025-08-13 PROCEDURE — 93000 ELECTROCARDIOGRAM COMPLETE: CPT | Performed by: INTERNAL MEDICINE

## 2025-08-13 RX ORDER — CALCIUM CARBONATE 200(500)MG
500-1000 TABLET,CHEWABLE ORAL EVERY 8 HOURS PRN
COMMUNITY
Start: 2025-07-25

## 2025-08-13 RX ORDER — AMIODARONE HYDROCHLORIDE 200 MG/1
1 TABLET ORAL
COMMUNITY
Start: 2025-08-07

## 2025-08-13 RX ORDER — INSULIN LISPRO 100 [IU]/ML
40 INJECTION, SOLUTION INTRAVENOUS; SUBCUTANEOUS
COMMUNITY

## 2025-08-13 RX ORDER — APIXABAN 5 MG/1
1 TABLET, FILM COATED ORAL
COMMUNITY
Start: 2025-08-06

## 2025-08-13 RX ORDER — TORSEMIDE 10 MG/1
10 TABLET ORAL
COMMUNITY
Start: 2025-07-25

## 2025-08-13 RX ORDER — AMLODIPINE BESYLATE 5 MG/1
5 TABLET ORAL NIGHTLY
COMMUNITY
Start: 2025-07-28

## 2025-08-13 RX ORDER — PREGABALIN 25 MG/1
1 CAPSULE ORAL
COMMUNITY
Start: 2025-08-07

## 2025-08-13 ASSESSMENT — ENCOUNTER SYMPTOMS
NAUSEA: 1
DYSPNEA ON EXERTION: 1
DIZZINESS: 1

## 2025-08-15 ENCOUNTER — PATIENT OUTREACH (OUTPATIENT)
Dept: CARDIOLOGY | Facility: CLINIC | Age: 65
End: 2025-08-15
Payer: COMMERCIAL

## 2025-10-30 ENCOUNTER — APPOINTMENT (OUTPATIENT)
Dept: CARDIOLOGY | Facility: CLINIC | Age: 65
End: 2025-10-30
Payer: MEDICARE

## 2026-03-25 ENCOUNTER — APPOINTMENT (OUTPATIENT)
Dept: CARDIOLOGY | Facility: CLINIC | Age: 66
End: 2026-03-25
Payer: MEDICARE